# Patient Record
Sex: MALE | Race: WHITE | NOT HISPANIC OR LATINO | Employment: OTHER | URBAN - METROPOLITAN AREA
[De-identification: names, ages, dates, MRNs, and addresses within clinical notes are randomized per-mention and may not be internally consistent; named-entity substitution may affect disease eponyms.]

---

## 2018-01-01 ENCOUNTER — HOSPITAL ENCOUNTER (EMERGENCY)
Facility: HOSPITAL | Age: 73
Discharge: HOME | End: 2018-01-01
Attending: EMERGENCY MEDICINE | Admitting: EMERGENCY MEDICINE
Payer: MEDICARE

## 2018-01-01 VITALS
RESPIRATION RATE: 18 BRPM | OXYGEN SATURATION: 99 % | HEIGHT: 69 IN | DIASTOLIC BLOOD PRESSURE: 81 MMHG | TEMPERATURE: 98.9 F | BODY MASS INDEX: 25.21 KG/M2 | WEIGHT: 170.19 LBS | SYSTOLIC BLOOD PRESSURE: 140 MMHG | HEART RATE: 75 BPM

## 2018-01-01 DIAGNOSIS — R04.0 RIGHT-SIDED EPISTAXIS: Primary | ICD-10-CM

## 2018-01-01 PROCEDURE — 99283 EMERGENCY DEPT VISIT LOW MDM: CPT

## 2018-01-01 RX ORDER — METOPROLOL SUCCINATE 50 MG/1
50 TABLET, EXTENDED RELEASE ORAL DAILY
COMMUNITY

## 2018-01-01 RX ORDER — CLOPIDOGREL BISULFATE 75 MG/1
75 TABLET ORAL DAILY
COMMUNITY

## 2018-01-01 RX ORDER — MULTIVITAMIN
1 TABLET ORAL DAILY
COMMUNITY

## 2018-01-01 RX ORDER — TRANEXAMIC ACID 100 MG/ML
500 INJECTION, SOLUTION INTRAVENOUS ONCE
Status: COMPLETED | OUTPATIENT
Start: 2018-01-01 | End: 2018-01-01

## 2018-01-01 RX ORDER — OXYMETAZOLINE HYDROCHLORIDE 0.05 G/100ML
2 SPRAY NASAL ONCE
Status: COMPLETED | OUTPATIENT
Start: 2018-01-01 | End: 2018-01-01

## 2018-01-01 RX ORDER — ATORVASTATIN CALCIUM 40 MG/1
40 TABLET, FILM COATED ORAL DAILY
COMMUNITY

## 2018-01-01 RX ORDER — RABEPRAZOLE SODIUM 20 MG/1
20 TABLET, DELAYED RELEASE ORAL DAILY
COMMUNITY

## 2018-01-01 RX ORDER — ASPIRIN 81 MG/1
81 TABLET ORAL DAILY
COMMUNITY

## 2018-01-01 RX ADMIN — OXYMETAZOLINE HYDROCHLORIDE 2 SPRAY: 5 SPRAY NASAL at 11:10

## 2018-01-01 RX ADMIN — TRANEXAMIC ACID 500 MG: 100 INJECTION, SOLUTION INTRAVENOUS at 11:09

## 2018-01-01 RX ADMIN — SILVER NITRATE APPLICATORS 1 APPLICATOR: 25; 75 STICK TOPICAL at 11:10

## 2018-01-01 NOTE — ED PROVIDER NOTES
History  Chief Complaint   Patient presents with    Nose Bleed     patient is c/o a nose bleed that began around 0730 this morning  hx of same  denies feeling lightheaded or dizzy     56-year-old male here for nose bleed  Began around 730 this morning  No trauma  States he has been battling upper respiratory symptoms including nasal congestion for past 3 days  States his nose has been very dry because of his cold and frequent nose blowing  Has had nosebleeds in the past which required packing and ENT cauterization  Denies any symptoms at this time  No fevers chills nausea vomiting headache lightheadedness or dizziness  He does feel some of the bleeding dripping down the back of his throat  He does take aspirin and Plavix  Tetanus is up-to-date  States bleeding seems to have slowed down at this point   Had platelets checked recently and were normal         History provided by:  Patient   used: No    Nose Bleed   Location:  R nare  Severity:  Moderate  Duration:  3 hours  Timing:  Constant  Progression:  Unchanged  Chronicity:  New  Context: aspirin use and hypertension    Context: not anticoagulants, not BiPAP, not bleeding disorder, not CPAP, not drug use, not elevation change, not foreign body, not home oxygen, not nose picking, not thrombocytopenia, not trauma and not weather change    Context comment:  URI  Relieved by:  Nothing  Worsened by:  Nothing  Ineffective treatments:  None tried  Associated symptoms: blood in oropharynx and congestion    Associated symptoms: no cough, no dizziness, no facial pain, no fever, no headaches, no sinus pain, no sneezing, no sore throat and no syncope    Congestion:     Location:  Nasal    Interferes with sleep: no      Interferes with eating/drinking: no    Risk factors: no alcohol use, no allergies, no change in medication, no frequent nosebleeds, no head and neck surgery, no head and neck tumor, no intranasal steroids, no liver disease, no radiation treatment, no recent chemotherapy, no recent nasal surgery and no sinus problems        Prior to Admission Medications   Prescriptions Last Dose Informant Patient Reported? Taking? Glycopyrrolate-Formoterol (BEVESPI IN)   Yes Yes   Sig: Inhale 2 Inhalers 2 (two) times a day   Multiple Vitamin (MULTIVITAMIN) tablet   Yes Yes   Sig: Take 1 tablet by mouth daily   RABEprazole (ACIPHEX) 20 MG tablet   Yes Yes   Sig: Take 20 mg by mouth daily   aspirin (ECOTRIN LOW STRENGTH) 81 mg EC tablet   Yes Yes   Sig: Take 81 mg by mouth daily   atorvastatin (LIPITOR) 40 mg tablet   Yes Yes   Sig: Take 40 mg by mouth daily   clopidogrel (PLAVIX) 75 mg tablet   Yes Yes   Sig: Take 75 mg by mouth daily   metoprolol succinate (TOPROL-XL) 50 mg 24 hr tablet   Yes Yes   Sig: Take 50 mg by mouth daily      Facility-Administered Medications: None       Past Medical History:   Diagnosis Date    Cardiac disease     COPD (chronic obstructive pulmonary disease) ([facility] [address] )        Past Surgical History:   Procedure Laterality Date    CARDIAC SURGERY         History reviewed  No pertinent family history  I have reviewed and agree with the history as documented  Social History   Substance Use Topics    Smoking status: Former Smoker    Smokeless tobacco: Never Used    Alcohol use No        Review of Systems   Constitutional: Negative for activity change, appetite change, chills, diaphoresis, fatigue, fever and unexpected weight change  HENT: Positive for congestion and nosebleeds  Negative for rhinorrhea, sinus pain, sinus pressure, sneezing, sore throat and trouble swallowing  Eyes: Negative for photophobia and visual disturbance  Respiratory: Negative for apnea, cough, choking, chest tightness, shortness of breath, wheezing and stridor  Cardiovascular: Negative for chest pain, palpitations, leg swelling and syncope     Gastrointestinal: Negative for abdominal distention, abdominal pain, blood in stool, constipation, diarrhea, nausea and vomiting  Genitourinary: Negative for decreased urine volume, difficulty urinating, dysuria, enuresis, flank pain, frequency, hematuria and urgency  Musculoskeletal: Negative for arthralgias, myalgias, neck pain and neck stiffness  Skin: Negative for color change, pallor, rash and wound  Allergic/Immunologic: Negative  Neurological: Negative for dizziness, tremors, syncope, weakness, light-headedness, numbness and headaches  Hematological: Negative  Psychiatric/Behavioral: Negative  All other systems reviewed and are negative  Physical Exam  ED Triage Vitals [01/01/18 1003]   Temperature Pulse Respirations Blood Pressure SpO2   98 9 °F (37 2 °C) 75 18 165/81 98 %      Temp Source Heart Rate Source Patient Position - Orthostatic VS BP Location FiO2 (%)   Temporal Monitor Sitting Right arm --      Pain Score       No Pain           Orthostatic Vital Signs  Vitals:    01/01/18 1003 01/01/18 1234   BP: 165/81 140/81   Pulse: 75 75   Patient Position - Orthostatic VS: Sitting Lying       Physical Exam   Constitutional: He is oriented to person, place, and time  He appears well-developed and well-nourished  Non-toxic appearance  He does not have a sickly appearance  He does not appear ill  No distress  HENT:   Head: Normocephalic and atraumatic  Nose: Rhinorrhea present  No mucosal edema, nose lacerations, sinus tenderness, nasal deformity, septal deviation or nasal septal hematoma  Epistaxis (right nare) is observed  No foreign bodies  Eyes: EOM and lids are normal  Pupils are equal, round, and reactive to light  Neck: Normal range of motion  Neck supple  Cardiovascular: Normal rate, regular rhythm, S1 normal, S2 normal, normal heart sounds, intact distal pulses and normal pulses  Exam reveals no gallop, no distant heart sounds, no friction rub and no decreased pulses  No murmur heard    Pulses:       Radial pulses are 2+ on the right side, and 2+ on the left side    Pulmonary/Chest: Effort normal and breath sounds normal  No accessory muscle usage  No apnea, no tachypnea and no bradypnea  No respiratory distress  He has no decreased breath sounds  He has no wheezes  He has no rhonchi  He has no rales  Abdominal: Soft  Normal appearance and bowel sounds are normal  He exhibits no distension and no mass  There is no tenderness  There is no rigidity, no rebound and no guarding  No hernia  Musculoskeletal: Normal range of motion  He exhibits no edema, tenderness or deformity  Neurological: He is alert and oriented to person, place, and time  No cranial nerve deficit  GCS eye subscore is 4  GCS verbal subscore is 5  GCS motor subscore is 6  GCS 15  AAOx3  Ambulating in department without difficulty  CN II-XII grossly intact  No focal neuro deficits  Skin: Skin is warm, dry and intact  No rash noted  He is not diaphoretic  No erythema  No pallor  Psychiatric: His speech is normal    Nursing note and vitals reviewed        ED Medications  Medications   oxymetazoline (AFRIN) 0 05 % nasal spray 2 spray (2 sprays Each Nare Given 1/1/18 1110)   tranexamic acid 100mg/mL (for epistaxis) 500 mg (500 mg Nasal Given by Other 1/1/18 1109)   silver nitrate-potassium nitrate (ARZOL SILVER NITRATE) 36-55 % applicator 1 applicator (1 applicator Topical Given by Other 1/1/18 1110)       Diagnostic Studies  Results Reviewed     None                 No orders to display              Procedures  Epistaxis Mgmt  Date/Time: 1/1/2018 12:45 PM  Performed by: Chung Hendricks by: Sharon Chin     Patient location:  ED  Other Assisting Provider: No    Consent:     Consent obtained:  Verbal    Consent given by:  Patient    Risks discussed:  Bleeding, nasal injury, infection and pain  Universal protocol:     Procedure explained and questions answered to patient or proxy's satisfaction: yes      Patient identity confirmed:  Verbally with patient, arm band and hospital-assigned identification number  Anesthesia (see MAR for exact dosages): Anesthesia method:  None  Procedure details:     Treatment site:  R anterior    Repair method: Anterior packing with Afrin and TXA  Approach:  External    Spec Headlamp used: No      Treatment complexity:  Simple    Treatment episode: initial    Post-procedure details:     Assessment:  Bleeding stopped    Patient tolerance of procedure: Tolerated well, no immediate complications           Phone Contacts  ED Phone Contact    ED Course  ED Course as of Jan 01 1247   Mon Jan 01, 2018   1143 TXA and afrin applied                                MDM  Number of Diagnoses or Management Options  Right-sided epistaxis: new and requires workup  Diagnosis management comments: Differential diagnosis including but not limited to: anterior epistaxis, posterior epistaxis, anemia, bleeding diathesis, coagulopathy, hypertensive urgency  Plan: management of nose bleed  Hold off on lab eval at this time, asymptomatic, doubt acute blood loss anemia  Risk of Complications, Morbidity, and/or Mortality  Presenting problems: moderate  Management options: low  General comments: 51-year-old male with epistaxis, right nares  After epistaxis and anterior packing nosebleed has completely stopped  Observe shortly thereafter and has had no recurrence bleed  He is feeling well  Asymptomatic  Doubt acute blood loss anemia  He has ENT that he previously followed with and states he will call for follow-up appointment when he is back home  Provided return instructions  Basic instructions on caring for nosebleed provided  Pt understands and agrees with plan       Patient Progress  Patient progress: stable    CritCare Time    Disposition  Final diagnoses:   Right-sided epistaxis     Time reflects when diagnosis was documented in both MDM as applicable and the Disposition within this note     Time User Action Codes Description Comment    1/1/2018 12:36 PM Leilani Morales Add [R04 0] Right-sided epistaxis       ED Disposition     ED Disposition Condition Comment    Discharge  Ino Ureña discharge to home/self care  Condition at discharge: Good        Follow-up Information     Follow up With Specialties Details Why Contact Info    Your ENT  Call for follow up Previously established    PCP  Call for follow up on your URI symptoms previously established        Discharge Medication List as of 1/1/2018 12:38 PM      CONTINUE these medications which have NOT CHANGED    Details   aspirin (ECOTRIN LOW STRENGTH) 81 mg EC tablet Take 81 mg by mouth daily, Historical Med      atorvastatin (LIPITOR) 40 mg tablet Take 40 mg by mouth daily, Historical Med      clopidogrel (PLAVIX) 75 mg tablet Take 75 mg by mouth daily, Historical Med      Glycopyrrolate-Formoterol (BEVESPI IN) Inhale 2 Inhalers 2 (two) times a day, Historical Med      metoprolol succinate (TOPROL-XL) 50 mg 24 hr tablet Take 50 mg by mouth daily, Historical Med      Multiple Vitamin (MULTIVITAMIN) tablet Take 1 tablet by mouth daily, Historical Med      RABEprazole (ACIPHEX) 20 MG tablet Take 20 mg by mouth daily, Historical Med           No discharge procedures on file      ED Provider  Electronically Signed by           Yenny Cyr PA-C  01/01/18 9818

## 2018-01-01 NOTE — ED NOTES
Family at bedside, waiting for discharge paperwork at this time          Nayana Brennan RN  01/01/18 0520

## 2018-01-01 NOTE — DISCHARGE INSTRUCTIONS
Return to the Emergency Department sooner if increased bleeding, pain, fever, vomiting, difficulty breathing, dizziness, weakness  Direct pressure if re-bleeding  Nosebleed   WHAT YOU NEED TO KNOW:   What do I need to know about a nosebleed? A nosebleed, or epistaxis, occurs when one or more of the blood vessels in your nose break  You may have dark or bright red blood from one or both nostrils  A nosebleed can be caused by any of the following:  · Cold, dry air    · Trauma from picking your nose or a direct blow to your nose    · Abnormal nose structure, such as a deviated septum    · Irritation or inflammation from a cold, respiratory infection, or allergies    · An object stuck in your nose    · Certain medicines, such as blood thinners  How is a nosebleed diagnosed? · A nasal exam  may show blood clots or swelling  Your healthcare provider will use an instrument called a speculum to check the inside of your nose  This gently opens your nostrils so your healthcare provider can see what part of your nose is bleeding  · A nasal endoscopy  is a deeper exam of the inside of your nose  Your healthcare provider uses a scope (thin, flexible tube with a light and camera on the end) to see further into your nose  What first aid should I do for a nosebleed? · Sit up and lean forward  This will help prevent you from swallowing blood  Spit blood and saliva into a bowl  · Apply pressure to your nose  Use 2 fingers to pinch your nose shut for 10 to 15 minutes  This will help stop the bleeding  · Apply ice  on the bridge of your nose to decrease swelling and bleeding  Use a cold pack or put crushed ice in a plastic bag  Cover it with a towel to protect your skin  · Pack your nose  with a cotton ball, tissue, tampon, or gauze bandage to stop the bleeding  How is a severe nosebleed treated?   You may need any of the following if the bleeding does not stop after first aid is done:  · Medicines may be applied to a small piece of cotton and placed in your nose  Medicine may also be sprayed in or applied directly to your nose  You may need medicine to prevent an infection  If bleeding is severe, medicine may be injected into a blood vessel in your nose  · Cautery  is when a chemical or electric device is used to seal the blood vessels  This may be done to stop bleeding or prevent more bleeding  Local anesthesia may be used  · Nasal packing  is when layers of gauze are placed in your nose to provide pressure and stop the bleeding  Local anesthesia may be used  Nasal packing is usually removed in 2 to 3 days  · Embolization  is a procedure used to stop the bleeding from inside your nose  Medical glue or a small balloon device may be used to clog the blood vessels in your nose  · Surgery  may be needed if your nosebleed returns over and over long-term  An artery may be tied to stop bleeding  Damaged tissue or an abnormal structure in your nose may be repaired  How can I help prevent another nosebleed? · Keep your nose moist   Put a small amount of petroleum jelly inside your nostrils as needed  Use a saline (saltwater) nasal spray  Do not put anything else inside your nose unless your healthcare provider says it is okay  Do not  use oil-based lubricants if you use oxygen therapy  They may be flammable  · Use a cool mist humidifier to increase air moisture in your home  This will help your nose stay moist      · Do not pick or blow your nose for at least a week  You can irritate or damage your nose if you pick it  Blowing your nose too hard may cause the bleeding to start again  Do not bend over or strain as this can cause the bleeding to start again  · Avoid irritants  such as tobacco smoke or chemical sprays such as   When should I seek immediate care? · Your nose is still bleeding after 20 minutes, even after you pinch it       · Your nasal packing is soaked with blood     · You have a foul-smelling discharge coming out of your nose  · You feel so weak and dizzy that you have trouble standing up  · You have trouble breathing or talking  When should I contact my healthcare provider? · You have a fever and are vomiting  · You have pain in and around your nose  · Your nasal pack is loose  · You have questions or concerns about your condition or care  CARE AGREEMENT:   You have the right to help plan your care  Learn about your health condition and how it may be treated  Discuss treatment options with your caregivers to decide what care you want to receive  You always have the right to refuse treatment  The above information is an  only  It is not intended as medical advice for individual conditions or treatments  Talk to your doctor, nurse or pharmacist before following any medical regimen to see if it is safe and effective for you  © 2017 [employer] Information is for End User's use only and may not be sold, redistributed or otherwise used for commercial purposes  All illustrations and images included in CareNotes® are the copyrighted property of [employer]  or Eduar Fajardo